# Patient Record
Sex: MALE | Race: WHITE | Employment: OTHER | URBAN - METROPOLITAN AREA
[De-identification: names, ages, dates, MRNs, and addresses within clinical notes are randomized per-mention and may not be internally consistent; named-entity substitution may affect disease eponyms.]

---

## 2018-08-02 ENCOUNTER — HOSPITAL ENCOUNTER (OUTPATIENT)
Dept: GENERAL RADIOLOGY | Age: 58
Discharge: HOME | End: 2018-08-02
Attending: PHYSICAL MEDICINE & REHABILITATION
Payer: COMMERCIAL

## 2018-08-02 DIAGNOSIS — M54.9 BACK PAIN: ICD-10-CM

## 2018-08-02 PROCEDURE — 72110 X-RAY EXAM L-2 SPINE 4/>VWS: CPT

## 2021-09-07 ENCOUNTER — HOSPITAL ENCOUNTER (OUTPATIENT)
Dept: GENERAL RADIOLOGY | Age: 61
Discharge: HOME | End: 2021-09-07
Attending: INTERNAL MEDICINE
Payer: COMMERCIAL

## 2021-09-07 ENCOUNTER — TRANSCRIBE ORDER (OUTPATIENT)
Dept: GENERAL RADIOLOGY | Age: 61
End: 2021-09-07

## 2021-09-07 DIAGNOSIS — J40 BRONCHITIS, NOT SPECIFIED AS ACUTE OR CHRONIC: Primary | ICD-10-CM

## 2021-09-07 DIAGNOSIS — J40 BRONCHITIS, NOT SPECIFIED AS ACUTE OR CHRONIC: ICD-10-CM

## 2021-09-07 PROCEDURE — 71046 X-RAY EXAM CHEST 2 VIEWS: CPT

## 2022-02-07 ENCOUNTER — HOSPITAL ENCOUNTER (OUTPATIENT)
Dept: ULTRASOUND IMAGING | Age: 62
Discharge: HOME | End: 2022-02-07
Attending: NURSE PRACTITIONER
Payer: COMMERCIAL

## 2022-02-07 DIAGNOSIS — R55 SYNCOPE AND COLLAPSE: ICD-10-CM

## 2022-02-07 DIAGNOSIS — Z13.6 ENCOUNTER FOR SCREENING FOR CARDIOVASCULAR DISORDERS: ICD-10-CM

## 2022-02-07 LAB
LEFT CCA DIST DIAS: 32.2 CM/S
LEFT CCA DIST SYS: 103.2 CM/S
LEFT CCA PROX DIAS: 26 CM/S
LEFT CCA PROX SYS: 109.1 CM/S
LEFT ECA DIAS: 15.57 CM/S
LEFT ECA SYS: 118.1 CM/S
LEFT ICA DIST DIAS: 33.2 CM/S
LEFT ICA DIST SYS: 86.1 CM/S
LEFT ICA MID DIAS: 32.8 CM/S
LEFT ICA MID SYS: 72.7 CM/S
LEFT ICA PROX DIAS: 16.5 CM/S
LEFT ICA PROX SYS: 55.6 CM/S
LEFT ICA/CCA SYS: 0.83
LEFT VERTEBRAL DIAS: 19.22 CM/S
LEFT VERTEBRAL SYS: 46.7 CM/S
RIGHT CCA DIST DIAS: 24.1 CM/S
RIGHT CCA DIST SYS: 93.5 CM/S
RIGHT CCA PROX DIAS: 23.4 CM/S
RIGHT CCA PROX SYS: 89.7 CM/S
RIGHT ECA DIAS: 14.61 CM/S
RIGHT ECA SYS: 85 CM/S
RIGHT ICA DIST DIAS: 39.7 CM/S
RIGHT ICA DIST SYS: 96.1 CM/S
RIGHT ICA MID DIAS: 27.9 CM/S
RIGHT ICA MID SYS: 74.8 CM/S
RIGHT ICA PROX DIAS: 22 CM/S
RIGHT ICA PROX SYS: 66.3 CM/S
RIGHT ICA/CCA SYS: 1
RIGHT VERTEBRAL DIAS: 8.68 CM/S
RIGHT VERTEBRAL SYS: 41.7 CM/S

## 2022-02-07 PROCEDURE — 76706 US ABDL AORTA SCREEN AAA: CPT

## 2022-02-07 PROCEDURE — 93880 EXTRACRANIAL BILAT STUDY: CPT

## 2024-07-16 ENCOUNTER — TRANSCRIBE ORDERS (OUTPATIENT)
Facility: HOSPITAL | Age: 64
End: 2024-07-16

## 2024-07-16 ENCOUNTER — HOSPITAL ENCOUNTER (OUTPATIENT)
Facility: HOSPITAL | Age: 64
Discharge: HOME | End: 2024-07-19

## 2024-07-16 DIAGNOSIS — R10.32 LEFT LOWER QUADRANT PAIN: ICD-10-CM

## 2024-07-16 DIAGNOSIS — R10.32 LEFT LOWER QUADRANT PAIN: Primary | ICD-10-CM

## 2024-07-16 PROCEDURE — 74176 CT ABD & PELVIS W/O CONTRAST: CPT

## 2024-10-07 ENCOUNTER — HOSPITAL ENCOUNTER (OUTPATIENT)
Facility: HOSPITAL | Age: 64
Discharge: HOME | End: 2024-10-10
Payer: MEDICARE

## 2024-10-07 DIAGNOSIS — N17.9 ACUTE KIDNEY INJURY SUPERIMPOSED ON CHRONIC KIDNEY DISEASE (HCC): ICD-10-CM

## 2024-10-07 DIAGNOSIS — N20.0 CALCULUS OF KIDNEY: ICD-10-CM

## 2024-10-07 DIAGNOSIS — N18.9 ACUTE KIDNEY INJURY SUPERIMPOSED ON CHRONIC KIDNEY DISEASE (HCC): ICD-10-CM

## 2024-10-07 DIAGNOSIS — N18.31 STAGE 3A CHRONIC KIDNEY DISEASE (HCC): ICD-10-CM

## 2024-10-07 PROCEDURE — 76770 US EXAM ABDO BACK WALL COMP: CPT

## 2024-11-20 ENCOUNTER — HOSPITAL ENCOUNTER (OUTPATIENT)
Facility: HOSPITAL | Age: 64
Discharge: HOME | End: 2024-11-23
Payer: MEDICARE

## 2024-11-20 LAB
ALBUMIN SERPL-MCNC: 3.6 G/DL (ref 3.5–5)
ANION GAP SERPL CALC-SCNC: 8 MMOL/L (ref 2–12)
APPEARANCE UR: CLEAR
BACTERIA URNS QL MICRO: NEGATIVE /HPF
BILIRUB UR QL: NEGATIVE
BUN SERPL-MCNC: 21 MG/DL (ref 6–20)
BUN/CREAT SERPL: 14 (ref 12–20)
CALCIUM SERPL-MCNC: 9.2 MG/DL (ref 8.5–10.1)
CHLORIDE SERPL-SCNC: 105 MMOL/L (ref 97–108)
CO2 SERPL-SCNC: 30 MMOL/L (ref 21–32)
COLOR UR: NORMAL
CREAT SERPL-MCNC: 1.54 MG/DL (ref 0.7–1.3)
EPITH CASTS URNS QL MICRO: NORMAL /LPF
GLUCOSE SERPL-MCNC: 109 MG/DL (ref 65–100)
GLUCOSE UR STRIP.AUTO-MCNC: NEGATIVE MG/DL
HGB UR QL STRIP: NEGATIVE
KETONES UR QL STRIP.AUTO: NEGATIVE MG/DL
LEUKOCYTE ESTERASE UR QL STRIP.AUTO: NEGATIVE
NITRITE UR QL STRIP.AUTO: NEGATIVE
PH UR STRIP: 6 (ref 5–8)
PHOSPHATE SERPL-MCNC: 3.6 MG/DL (ref 2.6–4.7)
POTASSIUM SERPL-SCNC: 4 MMOL/L (ref 3.5–5.1)
PROT UR STRIP-MCNC: NEGATIVE MG/DL
RBC #/AREA URNS HPF: NORMAL /HPF (ref 0–5)
SODIUM SERPL-SCNC: 143 MMOL/L (ref 136–145)
SP GR UR REFRACTOMETRY: 1.02 (ref 1–1.03)
UROBILINOGEN UR QL STRIP.AUTO: 0.2 EU/DL (ref 0.2–1)
WBC URNS QL MICRO: NORMAL /HPF (ref 0–4)

## 2024-11-20 PROCEDURE — 36415 COLL VENOUS BLD VENIPUNCTURE: CPT

## 2024-11-20 PROCEDURE — 82570 ASSAY OF URINE CREATININE: CPT

## 2024-11-20 PROCEDURE — 80069 RENAL FUNCTION PANEL: CPT

## 2024-11-20 PROCEDURE — 81001 URINALYSIS AUTO W/SCOPE: CPT

## 2024-11-20 PROCEDURE — 82043 UR ALBUMIN QUANTITATIVE: CPT

## 2024-11-20 PROCEDURE — 84156 ASSAY OF PROTEIN URINE: CPT

## 2024-11-21 LAB
CREAT UR-MCNC: 152 MG/DL
CREAT UR-MCNC: 158 MG/DL
MICROALBUMIN UR-MCNC: 0.8 MG/DL
MICROALBUMIN/CREAT UR-RTO: 5 MG/G (ref 0–30)
PROT UR-MCNC: 10 MG/DL (ref 0–11.9)
PROT/CREAT UR-RTO: 0.1

## 2025-01-13 ENCOUNTER — HOSPITAL ENCOUNTER (OUTPATIENT)
Facility: HOSPITAL | Age: 65
Discharge: HOME | End: 2025-01-16
Payer: MEDICARE

## 2025-01-13 LAB
ANION GAP SERPL CALC-SCNC: 9 MMOL/L (ref 2–12)
BUN SERPL-MCNC: 24 MG/DL (ref 6–20)
BUN/CREAT SERPL: 17 (ref 12–20)
CALCIUM SERPL-MCNC: 9.3 MG/DL (ref 8.5–10.1)
CHLORIDE SERPL-SCNC: 104 MMOL/L (ref 97–108)
CO2 SERPL-SCNC: 30 MMOL/L (ref 21–32)
CREAT SERPL-MCNC: 1.44 MG/DL (ref 0.7–1.3)
GLUCOSE SERPL-MCNC: 98 MG/DL (ref 65–100)
POTASSIUM SERPL-SCNC: 4.2 MMOL/L (ref 3.5–5.1)
SODIUM SERPL-SCNC: 143 MMOL/L (ref 136–145)
URATE SERPL-MCNC: 7 MG/DL (ref 3.5–7.2)

## 2025-01-13 PROCEDURE — 36415 COLL VENOUS BLD VENIPUNCTURE: CPT

## 2025-01-13 PROCEDURE — 80048 BASIC METABOLIC PNL TOTAL CA: CPT

## 2025-01-13 PROCEDURE — 84550 ASSAY OF BLOOD/URIC ACID: CPT

## 2025-02-10 ENCOUNTER — HOSPITAL ENCOUNTER (OUTPATIENT)
Facility: HOSPITAL | Age: 65
Discharge: HOME | End: 2025-02-13
Payer: COMMERCIAL

## 2025-02-10 LAB
ALBUMIN SERPL-MCNC: 3.9 G/DL (ref 3.5–5)
ANION GAP SERPL CALC-SCNC: 12 MMOL/L (ref 2–12)
ANION GAP SERPL CALC-SCNC: 9 MMOL/L (ref 2–12)
BUN SERPL-MCNC: 21 MG/DL (ref 6–20)
BUN SERPL-MCNC: 21 MG/DL (ref 6–20)
BUN/CREAT SERPL: 14 (ref 12–20)
BUN/CREAT SERPL: 15 (ref 12–20)
CALCIUM SERPL-MCNC: 9.1 MG/DL (ref 8.5–10.1)
CALCIUM SERPL-MCNC: 9.3 MG/DL (ref 8.5–10.1)
CHLORIDE SERPL-SCNC: 102 MMOL/L (ref 97–108)
CHLORIDE SERPL-SCNC: 104 MMOL/L (ref 97–108)
CO2 SERPL-SCNC: 27 MMOL/L (ref 21–32)
CO2 SERPL-SCNC: 29 MMOL/L (ref 21–32)
CREAT SERPL-MCNC: 1.44 MG/DL (ref 0.7–1.3)
CREAT SERPL-MCNC: 1.49 MG/DL (ref 0.7–1.3)
GLUCOSE SERPL-MCNC: 101 MG/DL (ref 65–100)
GLUCOSE SERPL-MCNC: 103 MG/DL (ref 65–100)
PHOSPHATE SERPL-MCNC: 2.8 MG/DL (ref 2.6–4.7)
POTASSIUM SERPL-SCNC: 4.1 MMOL/L (ref 3.5–5.1)
POTASSIUM SERPL-SCNC: 4.6 MMOL/L (ref 3.5–5.1)
SODIUM SERPL-SCNC: 141 MMOL/L (ref 136–145)
SODIUM SERPL-SCNC: 142 MMOL/L (ref 136–145)
URATE SERPL-MCNC: 6 MG/DL (ref 3.5–7.2)

## 2025-02-10 PROCEDURE — 36415 COLL VENOUS BLD VENIPUNCTURE: CPT

## 2025-02-10 PROCEDURE — 80048 BASIC METABOLIC PNL TOTAL CA: CPT

## 2025-02-10 PROCEDURE — 80069 RENAL FUNCTION PANEL: CPT

## 2025-02-10 PROCEDURE — 84550 ASSAY OF BLOOD/URIC ACID: CPT

## 2025-02-12 ENCOUNTER — HOSPITAL ENCOUNTER (OUTPATIENT)
Facility: HOSPITAL | Age: 65
Setting detail: SPECIMEN
Discharge: HOME | End: 2025-02-15

## 2025-02-12 LAB
COLLECT DURATION TIME UR: 24 HR
SPECIMEN VOL 24H UR: 1300 ML
URATE 24H UR-MRATE: 505.7 MG/24HR (ref 150–990)

## 2025-03-04 ENCOUNTER — OFFICE VISIT (OUTPATIENT)
Age: 65
End: 2025-03-04

## 2025-03-04 VITALS
TEMPERATURE: 97.7 F | BODY MASS INDEX: 39.08 KG/M2 | DIASTOLIC BLOOD PRESSURE: 78 MMHG | RESPIRATION RATE: 20 BRPM | SYSTOLIC BLOOD PRESSURE: 124 MMHG | HEART RATE: 78 BPM | OXYGEN SATURATION: 94 % | WEIGHT: 273 LBS | HEIGHT: 70 IN

## 2025-03-04 DIAGNOSIS — Z86.0101 PERSONAL HISTORY OF ADENOMATOUS AND SERRATED COLON POLYPS: Primary | ICD-10-CM

## 2025-03-04 RX ORDER — TAMSULOSIN HYDROCHLORIDE 0.4 MG/1
0.4 CAPSULE ORAL DAILY
COMMUNITY

## 2025-03-04 RX ORDER — LISINOPRIL 20 MG/1
20 TABLET ORAL DAILY
COMMUNITY

## 2025-03-04 RX ORDER — TIRZEPATIDE 2.5 MG/.5ML
2.5 INJECTION, SOLUTION SUBCUTANEOUS WEEKLY
COMMUNITY

## 2025-03-04 RX ORDER — ALLOPURINOL 100 MG/1
150 TABLET ORAL DAILY
COMMUNITY

## 2025-03-04 RX ORDER — ASPIRIN 81 MG/1
81 TABLET, CHEWABLE ORAL DAILY
COMMUNITY

## 2025-03-04 ASSESSMENT — PATIENT HEALTH QUESTIONNAIRE - PHQ9
SUM OF ALL RESPONSES TO PHQ QUESTIONS 1-9: 0
1. LITTLE INTEREST OR PLEASURE IN DOING THINGS: NOT AT ALL
SUM OF ALL RESPONSES TO PHQ QUESTIONS 1-9: 0
2. FEELING DOWN, DEPRESSED OR HOPELESS: NOT AT ALL

## 2025-03-04 NOTE — PROGRESS NOTES
.Deyvi Boone Jr. is a 64 y.o. male who presents today with the following:  Chief Complaint   Patient presents with    New Patient    Colonoscopy       HPI    Deyvi Boone Jr. is a 64 y.o. male. He is a patient of Ashley Batista APRN - NP, who presents for a colonoscopy. He denies any history of melena, hematochezia, diarrhea or constipation on a regular basis. He denies any change in bowel habits.  He also denies any abdominal pain or unexpected weight loss.  He has had no change in the caliber of his  stool.  He has no family history of colon cancer and has not had any stool testing recently.  His last colonoscopy was in July 2021 where he was found to have diverticulosis and some rectosigmoid polyps with 1 of these being a tubular adenoma.    He has had bilateral inguinal hernia repair performed 20 years ago via laparoscopic approach.    He has a history of sleep apnea for which he uses CPAP.  He does have a history of hypertension.  He is a diabetic and on Mounjaro.    He does take an 81 mg aspirin a day.    He does not smoke and rarely drinks alcohol.    No past medical history on file.    No past surgical history on file.    Social History     Socioeconomic History    Marital status:      Spouse name: Not on file    Number of children: Not on file    Years of education: Not on file    Highest education level: Not on file   Occupational History    Not on file   Tobacco Use    Smoking status: Never    Smokeless tobacco: Never   Substance and Sexual Activity    Alcohol use: Not on file    Drug use: Not on file    Sexual activity: Not on file   Other Topics Concern    Not on file   Social History Narrative    Not on file     Social Determinants of Health     Financial Resource Strain: Not on file   Food Insecurity: Not on file   Transportation Needs: Not on file   Physical Activity: Not on file   Stress: Not on file   Social Connections: Not on file   Intimate Partner Violence: Not on file

## 2025-03-04 NOTE — PROGRESS NOTES
Identified pt with two pt identifiers (name and ). Reviewed chart in preparation for visit and have obtained necessary documentation.    Deyvi Boone Jr. is a 64 y.o. male New Patient and Colonoscopy  .    Vitals:    25 1505   BP: 124/78   Site: Right Upper Arm   Position: Sitting   Cuff Size: Large Adult   Pulse: 78   Resp: 20   Temp: 97.7 °F (36.5 °C)   TempSrc: Oral   SpO2: 94%   Weight: 123.8 kg (273 lb)   Height: 1.778 m (5' 10\")          1. Have you been to the ER, urgent care clinic since your last visit?  Hospitalized since your last visit?  no     2. Have you seen or consulted any other health care providers outside of the [facility] since your last visit?  Include any pap smears or colon screening.  yes - [facility]

## 2025-03-17 ENCOUNTER — TELEPHONE (OUTPATIENT)
Age: 65
End: 2025-03-17

## 2025-03-17 ENCOUNTER — PREP FOR PROCEDURE (OUTPATIENT)
Age: 65
End: 2025-03-17

## 2025-03-17 DIAGNOSIS — Z86.0101 PERSONAL HISTORY OF ADENOMATOUS AND SERRATED COLON POLYPS: ICD-10-CM

## 2025-03-17 PROBLEM — Z86.0100 HISTORY OF COLON POLYPS: Status: ACTIVE | Noted: 2025-03-17

## 2025-03-17 NOTE — TELEPHONE ENCOUNTER
Called patient and gave him his surgery date and post-op date and time. I went over the prep that he needs to start on 04/22/2025  Patient was reminded to stop taking his asprin 7 days prior to surgery. Informed patient that the OR would be calling him with the time that he needs to arrive. (Patient is a diabetic)    SURGERY: 04/23/2025    POST-OP DATE: 05/06/2025 2:30PM

## 2025-04-07 ENCOUNTER — ANESTHESIA EVENT (OUTPATIENT)
Facility: HOSPITAL | Age: 65
End: 2025-04-07
Payer: MEDICARE

## 2025-04-07 NOTE — ANESTHESIA PRE PROCEDURE
Department of Anesthesiology  Preprocedure Note       Name:  Deyvi Boone Jr.   Age:  64 y.o.  :  1960                                          MRN:  [id number]         Date:  2025      Surgeon: Surgeon(s):  Balwinder Mosher MD    Procedure: Procedure(s):  COLONOSCOPY    Medications prior to admission:   Prior to Admission medications    Medication Sig Start Date End Date Taking? Authorizing Provider   allopurinol (ZYLOPRIM) 100 MG tablet Take 1.5 tablets by mouth daily    Florentin Tai MD   tamsulosin (FLOMAX) 0.4 MG capsule Take 1 capsule by mouth daily    Florentin Tai MD   FLUoxetine (PROZAC) 20 MG capsule Take 2 capsules by mouth daily    Florentin Tai MD   lisinopril (PRINIVIL;ZESTRIL) 20 MG tablet Take 1 tablet by mouth daily    Florentin Tai MD   Tirzepatide (MOUNJARO) 2.5 MG/0.5ML SOAJ Inject 2.5 mg into the skin once a week    Florentin Tai MD   aspirin 81 MG chewable tablet Take 1 tablet by mouth daily    Florentin Tai MD   melatonin 3 MG TABS tablet Take by mouth daily    Florentin Tai MD       Current medications:    No current facility-administered medications for this encounter.     Current Outpatient Medications   Medication Sig Dispense Refill   • allopurinol (ZYLOPRIM) 100 MG tablet Take 1.5 tablets by mouth daily     • tamsulosin (FLOMAX) 0.4 MG capsule Take 1 capsule by mouth daily     • FLUoxetine (PROZAC) 20 MG capsule Take 2 capsules by mouth daily     • lisinopril (PRINIVIL;ZESTRIL) 20 MG tablet Take 1 tablet by mouth daily     • Tirzepatide (MOUNJARO) 2.5 MG/0.5ML SOAJ Inject 2.5 mg into the skin once a week     • aspirin 81 MG chewable tablet Take 1 tablet by mouth daily     • melatonin 3 MG TABS tablet Take by mouth daily         Allergies:  No Known Allergies    Problem List:    Patient Active Problem List   Diagnosis Code   • Personal history of adenomatous and serrated colon polyps Z86.0101   • History of colon polyps

## 2025-04-15 ENCOUNTER — HOSPITAL ENCOUNTER (OUTPATIENT)
Facility: HOSPITAL | Age: 65
Discharge: HOME | End: 2025-04-18
Payer: MEDICARE

## 2025-04-15 LAB
ALBUMIN SERPL-MCNC: 3.8 G/DL (ref 3.5–5)
ANION GAP SERPL CALC-SCNC: 9 MMOL/L (ref 2–12)
APPEARANCE UR: CLEAR
BACTERIA URNS QL MICRO: NEGATIVE /HPF
BILIRUB UR QL CFM: NEGATIVE
BUN SERPL-MCNC: 15 MG/DL (ref 6–20)
BUN/CREAT SERPL: 11 (ref 12–20)
CALCIUM SERPL-MCNC: 9 MG/DL (ref 8.5–10.1)
CHLORIDE SERPL-SCNC: 103 MMOL/L (ref 97–108)
CO2 SERPL-SCNC: 28 MMOL/L (ref 21–32)
COLOR UR: ABNORMAL
CREAT SERPL-MCNC: 1.38 MG/DL (ref 0.7–1.3)
EPITH CASTS URNS QL MICRO: ABNORMAL /LPF
GLUCOSE SERPL-MCNC: 99 MG/DL (ref 65–100)
GLUCOSE UR STRIP.AUTO-MCNC: NEGATIVE MG/DL
HGB UR QL STRIP: NEGATIVE
KETONES UR QL STRIP.AUTO: NEGATIVE MG/DL
LEUKOCYTE ESTERASE UR QL STRIP.AUTO: ABNORMAL
NITRITE UR QL STRIP.AUTO: NEGATIVE
PH UR STRIP: 6 (ref 5–8)
PHOSPHATE SERPL-MCNC: 3.1 MG/DL (ref 2.6–4.7)
POTASSIUM SERPL-SCNC: 5 MMOL/L (ref 3.5–5.1)
PROT UR STRIP-MCNC: ABNORMAL MG/DL
RBC #/AREA URNS HPF: ABNORMAL /HPF (ref 0–5)
SODIUM SERPL-SCNC: 140 MMOL/L (ref 136–145)
SP GR UR REFRACTOMETRY: 1.02 (ref 1–1.03)
URATE SERPL-MCNC: 4.7 MG/DL (ref 3.5–7.2)
UROBILINOGEN UR QL STRIP.AUTO: 1 EU/DL (ref 0.2–1)
WBC URNS QL MICRO: ABNORMAL /HPF (ref 0–4)

## 2025-04-15 PROCEDURE — 36415 COLL VENOUS BLD VENIPUNCTURE: CPT

## 2025-04-15 PROCEDURE — 80069 RENAL FUNCTION PANEL: CPT

## 2025-04-15 PROCEDURE — 81001 URINALYSIS AUTO W/SCOPE: CPT

## 2025-04-15 PROCEDURE — 84550 ASSAY OF BLOOD/URIC ACID: CPT

## 2025-04-15 NOTE — PERIOP NOTE
[facility]  SURGICAL PRE-ADMISSION INSTRUCTIONS    ARRIVAL  You will be called the day before your surgery with your expected arrival time.  Sign in at the  of the hospital.  You will be directed to the Surgical Waiting Room.  Please arrive at your scheduled appointment time.  You have been scheduled to arrive for your procedure one or two hours prior to the expected start time of your procedure.  Every effort will be made to minimize your wait but please be aware that unforeseen circumstances may affect our schedule.    EATING  DO NOT EAT OR DRINK ANYTHING AFTER MIDNIGHT ON THE EVENING BEFORE YOUR SURGERY OR ON THE DAY OF YOUR SURGERY except for your medications (as instructed) with a sip of water.  Do not use gum, mints or lozenges on the morning of your surgery.  Please do not smoke or chew tobacco before your surgery.    MEDICATIONS   Take the following medications on the morning of your surgery with the smallest amount of water possible : none    STOP THESE MEDICATIONS AT THE TIMES LISTED BELOW  Aspirin ;  7 days before                                                   DRIVING/TRANSPORATION  Have a responsible adult to drive you home from the hospital and to stay with you over night.  Please have them plan to remain in the hospital during your surgery.  Your surgery will not be done if you do not have a responsible adult to take you home and to stay with you.  If you have arranged for public transport, you must have a responsible adult to ride with you (who is not the ).  You may not drive for 24 hours after anesthesia.    PREPARATION  If you have a Living WiIl/Advance Directive, please bring a copy with you to scan into your chart.   Please DO NOT wear makeup or nail polish  Please leave valuables at home,  DO NOT wear jewelry.  Wear loose, comfortable clothing that is large enough to cover a bulky dressing.    SPECIAL INSTRUCTIONS:  Follow your surgeon's

## 2025-04-22 RX ORDER — SODIUM CHLORIDE, SODIUM LACTATE, POTASSIUM CHLORIDE, CALCIUM CHLORIDE 600; 310; 30; 20 MG/100ML; MG/100ML; MG/100ML; MG/100ML
INJECTION, SOLUTION INTRAVENOUS CONTINUOUS
Status: CANCELLED | OUTPATIENT
Start: 2025-04-22

## 2025-04-22 RX ORDER — SODIUM CHLORIDE 9 MG/ML
INJECTION, SOLUTION INTRAVENOUS PRN
Status: CANCELLED | OUTPATIENT
Start: 2025-04-22

## 2025-04-22 RX ORDER — SODIUM CHLORIDE 0.9 % (FLUSH) 0.9 %
5-40 SYRINGE (ML) INJECTION EVERY 12 HOURS SCHEDULED
Status: CANCELLED | OUTPATIENT
Start: 2025-04-22

## 2025-04-22 RX ORDER — SODIUM CHLORIDE 0.9 % (FLUSH) 0.9 %
5-40 SYRINGE (ML) INJECTION PRN
Status: CANCELLED | OUTPATIENT
Start: 2025-04-22

## 2025-04-22 NOTE — ASSESSMENT & PLAN NOTE
Recommend colonoscopy.  The procedure was explained in detail including the risks and benefits.  Risks shared included risks of missed lesions, incomplete exam, colon injury or perforation.   Risks associated with anesthesia were also discussed.  The patient wishes to proceed and we will schedule.

## 2025-04-22 NOTE — H&P (VIEW-ONLY)
KT Boone Jr. is a 64 y.o. male. He is a patient of Ashley Batista APRN - NP, who presents for a colonoscopy. He denies any history of melena, hematochezia, diarrhea or constipation on a regular basis. He denies any change in bowel habits.  He also denies any abdominal pain or unexpected weight loss.  He has had no change in the caliber of his  stool.  He has no family history of colon cancer and has not had any stool testing recently.  His last colonoscopy was in July 2021 where he was found to have diverticulosis and some rectosigmoid polyps with 1 of these being a tubular adenoma.    He has had bilateral inguinal hernia repair performed 20 years ago via laparoscopic approach.    He has a history of sleep apnea for which he uses CPAP.  He does have a history of hypertension.  He is a diabetic and on Mounjaro.    He does take an 81 mg aspirin a day.    He does not smoke and rarely drinks alcohol.    Past Medical History:   Diagnosis Date    Cholelithiasis     Chronic renal failure 2024    stage 3a    Depression with anxiety     Gout     Hypertension     Renal stones     Sleep apnea     Type 2 diabetes mellitus        Past Surgical History:   Procedure Laterality Date    CATARACT REMOVAL Left     TONSILLECTOMY         Social History     Socioeconomic History    Marital status:      Spouse name: Not on file    Number of children: Not on file    Years of education: Not on file    Highest education level: Not on file   Occupational History    Not on file   Tobacco Use    Smoking status: Never    Smokeless tobacco: Never   Substance and Sexual Activity    Alcohol use: Not on file    Drug use: Not on file    Sexual activity: Not on file   Other Topics Concern    Not on file   Social History Narrative    Not on file     Social Drivers of Health     Financial Resource Strain: Not on file   Food Insecurity: Not on file   Transportation Needs: Not on file   Physical Activity: Not on file

## 2025-04-23 ENCOUNTER — HOSPITAL ENCOUNTER (OUTPATIENT)
Facility: HOSPITAL | Age: 65
Setting detail: OUTPATIENT SURGERY
Discharge: HOME | End: 2025-04-23
Attending: SURGERY | Admitting: SURGERY
Payer: MEDICARE

## 2025-04-23 ENCOUNTER — ANESTHESIA (OUTPATIENT)
Facility: HOSPITAL | Age: 65
End: 2025-04-23
Payer: MEDICARE

## 2025-04-23 VITALS
HEART RATE: 64 BPM | OXYGEN SATURATION: 95 % | BODY MASS INDEX: 37.37 KG/M2 | SYSTOLIC BLOOD PRESSURE: 119 MMHG | TEMPERATURE: 97.6 F | RESPIRATION RATE: 18 BRPM | DIASTOLIC BLOOD PRESSURE: 82 MMHG | WEIGHT: 261 LBS | HEIGHT: 70 IN

## 2025-04-23 LAB
GLUCOSE BLD STRIP.AUTO-MCNC: 105 MG/DL (ref 65–117)
SERVICE CMNT-IMP: NORMAL

## 2025-04-23 PROCEDURE — 3700000000 HC ANESTHESIA ATTENDED CARE: Performed by: SURGERY

## 2025-04-23 PROCEDURE — 6360000002 HC RX W HCPCS: Performed by: ANESTHESIOLOGY

## 2025-04-23 PROCEDURE — 45380 COLONOSCOPY AND BIOPSY: CPT | Performed by: SURGERY

## 2025-04-23 PROCEDURE — 7100000000 HC PACU RECOVERY - FIRST 15 MIN: Performed by: SURGERY

## 2025-04-23 PROCEDURE — 2580000003 HC RX 258: Performed by: SURGERY

## 2025-04-23 PROCEDURE — 82962 GLUCOSE BLOOD TEST: CPT

## 2025-04-23 PROCEDURE — 2709999900 HC NON-CHARGEABLE SUPPLY: Performed by: SURGERY

## 2025-04-23 PROCEDURE — 3700000001 HC ADD 15 MINUTES (ANESTHESIA): Performed by: SURGERY

## 2025-04-23 PROCEDURE — 3600000012 HC SURGERY LEVEL 2 ADDTL 15MIN: Performed by: SURGERY

## 2025-04-23 PROCEDURE — 3600000002 HC SURGERY LEVEL 2 BASE: Performed by: SURGERY

## 2025-04-23 PROCEDURE — 7100000001 HC PACU RECOVERY - ADDTL 15 MIN: Performed by: SURGERY

## 2025-04-23 PROCEDURE — 88305 TISSUE EXAM BY PATHOLOGIST: CPT

## 2025-04-23 RX ORDER — SODIUM CHLORIDE 0.9 % (FLUSH) 0.9 %
5-40 SYRINGE (ML) INJECTION EVERY 12 HOURS SCHEDULED
Status: DISCONTINUED | OUTPATIENT
Start: 2025-04-23 | End: 2025-04-23 | Stop reason: HOSPADM

## 2025-04-23 RX ORDER — NALOXONE HYDROCHLORIDE 0.4 MG/ML
INJECTION, SOLUTION INTRAMUSCULAR; INTRAVENOUS; SUBCUTANEOUS PRN
Status: DISCONTINUED | OUTPATIENT
Start: 2025-04-23 | End: 2025-04-23 | Stop reason: HOSPADM

## 2025-04-23 RX ORDER — SODIUM CHLORIDE 0.9 % (FLUSH) 0.9 %
5-40 SYRINGE (ML) INJECTION PRN
Status: DISCONTINUED | OUTPATIENT
Start: 2025-04-23 | End: 2025-04-23 | Stop reason: HOSPADM

## 2025-04-23 RX ORDER — PROPOFOL 10 MG/ML
INJECTION, EMULSION INTRAVENOUS
Status: DISCONTINUED | OUTPATIENT
Start: 2025-04-23 | End: 2025-04-23 | Stop reason: SDUPTHER

## 2025-04-23 RX ORDER — LIDOCAINE HYDROCHLORIDE 20 MG/ML
INJECTION, SOLUTION EPIDURAL; INFILTRATION; INTRACAUDAL; PERINEURAL
Status: DISCONTINUED | OUTPATIENT
Start: 2025-04-23 | End: 2025-04-23 | Stop reason: SDUPTHER

## 2025-04-23 RX ORDER — SODIUM CHLORIDE, SODIUM LACTATE, POTASSIUM CHLORIDE, CALCIUM CHLORIDE 600; 310; 30; 20 MG/100ML; MG/100ML; MG/100ML; MG/100ML
INJECTION, SOLUTION INTRAVENOUS CONTINUOUS
Status: DISCONTINUED | OUTPATIENT
Start: 2025-04-23 | End: 2025-04-23 | Stop reason: HOSPADM

## 2025-04-23 RX ORDER — SODIUM CHLORIDE 9 MG/ML
INJECTION, SOLUTION INTRAVENOUS PRN
Status: DISCONTINUED | OUTPATIENT
Start: 2025-04-23 | End: 2025-04-23 | Stop reason: HOSPADM

## 2025-04-23 RX ADMIN — PROPOFOL 100 MG: 10 INJECTION, EMULSION INTRAVENOUS at 08:13

## 2025-04-23 RX ADMIN — LIDOCAINE HYDROCHLORIDE 100 MG: 20 INJECTION, SOLUTION EPIDURAL; INFILTRATION; INTRACAUDAL; PERINEURAL at 08:12

## 2025-04-23 RX ADMIN — PROPOFOL 100 MG: 10 INJECTION, EMULSION INTRAVENOUS at 08:22

## 2025-04-23 RX ADMIN — SODIUM CHLORIDE, SODIUM LACTATE, POTASSIUM CHLORIDE, AND CALCIUM CHLORIDE: .6; .31; .03; .02 INJECTION, SOLUTION INTRAVENOUS at 07:39

## 2025-04-23 RX ADMIN — PROPOFOL 50 MG: 10 INJECTION, EMULSION INTRAVENOUS at 08:29

## 2025-04-23 RX ADMIN — PROPOFOL 70 MG: 10 INJECTION, EMULSION INTRAVENOUS at 08:19

## 2025-04-23 ASSESSMENT — PAIN - FUNCTIONAL ASSESSMENT: PAIN_FUNCTIONAL_ASSESSMENT: 0-10

## 2025-04-23 NOTE — INTERVAL H&P NOTE
Update History & Physical    The patient's History and Physical of April 22, 2025 was reviewed with the patient and I examined the patient. There was no change. The surgical site was confirmed by the patient and me.     Plan: The risks, benefits, expected outcome, and alternative to the recommended procedure have been discussed with the patient. Patient understands and wants to proceed with the procedure.     Electronically signed by Balwinder Mosher MD on 4/23/2025 at 8:07 AM

## 2025-04-23 NOTE — ANESTHESIA POSTPROCEDURE EVALUATION
Department of Anesthesiology  Postprocedure Note    Patient: Deyvi Boone Jr.  MRN: [id number]  YOB: 1960  Date of evaluation: 4/23/2025    Procedure Summary       Date: 04/23/25 Room / Location: [facility] MAIN OR [address] [place] [address] MAIN OR    Anesthesia Start: 0811 Anesthesia Stop: 0838    Procedure: COLONOSCOPY WITH COLD FORCEP POLYPECTOMY (Lower GI Region) Diagnosis:       History of colon polyps      (History of colon polyps [Z86.0100])    Surgeons: Balwinder Mosher MD Responsible Provider: Noni Gutierrez MD    Anesthesia Type: MAC ASA Status: 3            Anesthesia Type: No value filed.    Guilherme Phase I:      Guilherme Phase II:      Anesthesia Post Evaluation    Patient location during evaluation: bedside  Patient participation: complete - patient participated  Level of consciousness: awake and alert  Pain score: 0  Airway patency: patent  Nausea & Vomiting: no nausea  Cardiovascular status: hemodynamically stable  Respiratory status: acceptable  Hydration status: stable  Pain management: adequate    No notable events documented.

## 2025-04-23 NOTE — BRIEF OP NOTE
Brief Postoperative Note      Patient: Deyvi Boone Jr.  YOB: 1960  MRN: [id number]    Date of Procedure: 4/23/2025    Pre-Op Diagnosis Codes:      * History of colon polyps [Z86.0100]    Post-Op Diagnosis: Same       Procedure(s):  COLONOSCOPY WITH COLD FORCEP POLYPECTOMY    Surgeon(s):  Balwinder Mosher MD    Assistant:  * No surgical staff found *    Anesthesia: Monitor Anesthesia Care    Estimated Blood Loss (mL): Minimal    Complications: None    Specimens:   ID Type Source Tests Collected by Time Destination   1 : TRANSVERSE COLON POLYP Tissue Colon-Transverse SURGICAL PATHOLOGY Balwinder Mosher MD 4/23/2025 0827        Implants:  * No implants in log *      Drains: * No LDAs found *    Findings:  Infection Present At Time Of Surgery (PATOS) (choose all levels that have infection present):  No infection present  Other Findings:   1.  Transverse colon polyp removed by cold biopsy forceps  2.  Diverticulosis  3.  Internal hemorrhoids    Electronically signed by Balwinder Mosher MD on 4/23/2025 at 8:38 AM

## 2025-04-23 NOTE — OP NOTE
[address]  [address]                            OPERATIVE REPORT      PATIENT NAME: ELE SCHULTE                : 1960  MED REC NO: [id number]                       ROOM: OR  ACCOUNT NO: [id number]                       ADMIT DATE: 2025  PROVIDER: Balwinder Mosher MD    DATE OF SERVICE:  2025    PREOPERATIVE DIAGNOSES:  Personal history of colon polyps.    POSTOPERATIVE DIAGNOSES:  Personal history of colon polyps.    PROCEDURES PERFORMED:  Colonoscopy with cold forceps polypectomy.    SURGEON:  Balwinder Mosher MD    ASSISTANT:  None.    ANESTHESIA:  MAC.    ESTIMATED BLOOD LOSS:  Minimal.    SPECIMENS REMOVED:  Transverse colon polyp.    INTRAOPERATIVE FINDINGS:       1. Transverse colon polyp removed by cold biopsy forceps.     2. Diverticulosis.     3. Internal hemorrhoids.     COMPLICATIONS:  None.    IMPLANTS:  None.    INDICATIONS:  Personal history of colon polyps.    DESCRIPTION OF PROCEDURE:  The patient was brought to the operative theater.  Monitoring devices and nasal cannula O2 were placed per Anesthesia and he was placed in the left-side-down decubitus position.  IV sedation was administered and a time-out was performed.  Digital rectal exam was performed, which was essentially normal.  A well-lubricated Olympus colonoscope was introduced in the patient's rectum and advanced to the cecum without any difficulty.  The cecum was identified by the ileocecal valve and the appendiceal orifice.  The terminal ileum was briefly intubated and appeared to be normal.  The prep was adequate to proceed.  The scope was carefully withdrawn with mucosal surfaces circumferentially evaluated.  No significant abnormalities were noted in the cecum or ascending colon.  The transverse colon showed no lesions to the distal transverse colon where there was a small polypoid lesion that was approximately 3-4 mm in size.  This was  removed by cold biopsy forceps with specimen retrieved.  The patient did have scattered diverticulosis of the descending and sigmoid colons.  There was no other abnormality noted in the descending or sigmoid colons.  The scope was pulled back into the rectum and retroflexed, which revealed internal hemorrhoids.  The scope was then straightened out and carefully withdrawn.    He tolerated the procedure well and was transferred to PACU in stable condition.    DRAINS:  None.    DISPOSITION:  Stable.        MD ILYA GROVER/SHAVON  D:  04/23/2025 08:41:52  T:  04/23/2025 08:53:04  JOB #:  [id number]    CC:   Ashley Batista NP

## 2025-04-23 NOTE — ANESTHESIA POSTPROCEDURE EVALUATION
Department of Anesthesiology  Postprocedure Note    Patient: Deyvi Boone Jr.  MRN: [id number]  YOB: 1960  Date of evaluation: 4/23/2025    Procedure Summary       Date: 04/23/25 Room / Location: [facility] MAIN OR [address] [place] [address] MAIN OR    Anesthesia Start: 0811 Anesthesia Stop: 0838    Procedure: COLONOSCOPY WITH COLD FORCEP POLYPECTOMY (Lower GI Region) Diagnosis:       History of colon polyps      (History of colon polyps [Z86.0100])    Surgeons: Balwinder Mosher MD Responsible Provider: Noni Gutierrez MD    Anesthesia Type: MAC ASA Status: 3            Anesthesia Type: No value filed.    Guilherme Phase I: Guilherme Score: 10    Guilherme Phase II:      Anesthesia Post Evaluation    Patient location during evaluation: bedside  Patient participation: complete - patient participated  Level of consciousness: awake and alert  Pain score: 0  Airway patency: patent  Nausea & Vomiting: no nausea  Cardiovascular status: hemodynamically stable  Respiratory status: acceptable  Hydration status: stable  Pain management: adequate    No notable events documented.

## 2025-05-06 ENCOUNTER — OFFICE VISIT (OUTPATIENT)
Age: 65
End: 2025-05-06

## 2025-05-06 VITALS
HEIGHT: 70 IN | BODY MASS INDEX: 38.37 KG/M2 | HEART RATE: 60 BPM | TEMPERATURE: 98.1 F | WEIGHT: 268 LBS | DIASTOLIC BLOOD PRESSURE: 71 MMHG | SYSTOLIC BLOOD PRESSURE: 114 MMHG | OXYGEN SATURATION: 94 % | RESPIRATION RATE: 20 BRPM

## 2025-05-06 DIAGNOSIS — K57.90 DIVERTICULOSIS: ICD-10-CM

## 2025-05-06 DIAGNOSIS — Z86.0101 PERSONAL HISTORY OF ADENOMATOUS AND SERRATED COLON POLYPS: Primary | ICD-10-CM

## 2025-05-06 ASSESSMENT — PATIENT HEALTH QUESTIONNAIRE - PHQ9
1. LITTLE INTEREST OR PLEASURE IN DOING THINGS: NOT AT ALL
SUM OF ALL RESPONSES TO PHQ QUESTIONS 1-9: 0
2. FEELING DOWN, DEPRESSED OR HOPELESS: NOT AT ALL

## 2025-05-06 NOTE — PATIENT INSTRUCTIONS
High-Fiber Diet: Care Instructions  Overview     A high-fiber diet may help you relieve constipation and feel less bloated.  Your doctor and dietitian will help you make a high-fiber eating plan based on your personal needs. The plan will include the things you like to eat. It will also make sure that you get 25 to 35 grams of fiber a day.  Before you make changes to the way you eat, be sure to talk with your doctor or dietitian.  Follow-up care is a key part of your treatment and safety. Be sure to make and go to all appointments, and call your doctor if you are having problems. It's also a good idea to know your test results and keep a list of the medicines you take.  How can you care for yourself at home?  You can increase how much fiber you get if you eat more of certain foods. These foods include:  Whole-grain breads and cereals.  Fruits, such as pears, apples, and peaches. Eat the skins and peels if you can.  Vegetables, such as broccoli, cabbage, spinach, carrots, asparagus, and squash.  Starchy vegetables. These include potatoes with skins, kidney beans, and lima beans.  Take a fiber supplement every day if your doctor recommends it. Examples are Benefiber, Citrucel, FiberCon, and Metamucil. Ask your doctor how much to take.  Drink plenty of fluids. If you have kidney, heart, or liver disease and have to limit fluids, talk with your doctor before you increase the amount of fluids you drink.  Where can you learn more?  Go to https://www.boomtrain.net/patientEd and enter U654 to learn more about \"High-Fiber Diet: Care Instructions.\"  Current as of: May 9, 2022               Content Version: 13.6  © 3548-5864 [employer].   Care instructions adapted under license by [employer]. If you have questions about a medical condition or this instruction, always ask your healthcare professional. [employer] disclaims any warranty or liability for your use of this information.

## 2025-05-06 NOTE — PROGRESS NOTES
Identified pt with two pt identifiers (name and ). Reviewed chart in preparation for visit and have obtained necessary documentation.    Deyvi Boone Jr. is a 64 y.o. male Post-Op Check (Colonoscopy result)  .    Vitals:    25 0920   BP: 114/71   BP Site: Right Upper Arm   Patient Position: Sitting   BP Cuff Size: Medium Adult   Pulse: 60   Resp: 20   Temp: 98.1 °F (36.7 °C)   TempSrc: Oral   SpO2: 94%   Weight: 121.6 kg (268 lb)   Height: 1.778 m (5' 10\")          1. Have you been to the ER, urgent care clinic since your last visit?  Hospitalized since your last visit?  no     2. Have you seen or consulted any other health care providers outside of the [facility] since your last visit?  Include any pap smears or colon screening.  yes - [facility]

## 2025-05-06 NOTE — PROGRESS NOTES
Deyvi Boone Jr. is a 64 y.o. male who presents today with the following:  Chief Complaint   Patient presents with    Post-Op Check     Colonoscopy result       HPI    Mr. Boone presents for follow-up after his colonoscopy was performed back on April 23.  He has done well since the procedure.  Findings of diverticulosis internal hemorrhoids and a single polyp in the transverse colon were shared with him.  Pathology showed this to be a sessile serrated adenoma.  It was less than 5 mm in size.  He intermittently eats high-fiber.    Past Medical History:   Diagnosis Date    Cholelithiasis     Chronic renal failure 2024    stage 3a    Depression with anxiety     Gout     Hypertension     Renal stones     Sleep apnea     Type 2 diabetes mellitus (HCC)        Past Surgical History:   Procedure Laterality Date    CATARACT REMOVAL Left     COLONOSCOPY N/A 4/23/2025    COLONOSCOPY WITH COLD FORCEP POLYPECTOMY performed by Balwinder Mosher MD at [facility] MAIN OR    TONSILLECTOMY         Social History     Socioeconomic History    Marital status:      Spouse name: Not on file    Number of children: Not on file    Years of education: Not on file    Highest education level: Not on file   Occupational History    Not on file   Tobacco Use    Smoking status: Never    Smokeless tobacco: Never   Substance and Sexual Activity    Alcohol use: Not on file    Drug use: Not on file    Sexual activity: Not on file   Other Topics Concern    Not on file   Social History Narrative    Not on file     Social Drivers of Health     Financial Resource Strain: Not on file   Food Insecurity: Not on file   Transportation Needs: Not on file   Physical Activity: Not on file   Stress: Not on file   Social Connections: Not on file   Intimate Partner Violence: Not on file   Housing Stability: Not on file       No family history on file.    No Known Allergies    Current Outpatient Medications   Medication Sig Dispense Refill    allopurinol (ZYLOPRIM)

## (undated) DEVICE — SOLUTION IRRIG 1000ML H2O PIC PLAS SHATTERPROOF CONTAINER

## (undated) DEVICE — KIT ENDO OP4 CA 1.1+ BX20

## (undated) DEVICE — LINER,SEMI-RIGID,3000CC,50EA/CS: Brand: MEDLINE

## (undated) DEVICE — SYRINGE 20ML LL S/C 50

## (undated) DEVICE — BLUNT CANNULA: Brand: MONOJECT

## (undated) DEVICE — GOWN,ISO,KNITCUFF, PREMIUM BLUE, LV3,XL: Brand: MEDLINE INDUSTRIES, INC.

## (undated) DEVICE — FORCEPS BX L240CM JAW DIA2.2MM RAD JAW 4 HOT DISP